# Patient Record
(demographics unavailable — no encounter records)

---

## 2025-01-31 NOTE — HISTORY OF PRESENT ILLNESS
[FreeTextEntry1] : 22 y/o F with no significant PMH was seen at [facility] ED 2x on 12/21 and 1/9 with complaints of dizziness that persisted for a few hours. She states these episodes occur multiple times daily, and last a few seconds. Symptoms are not positional. Her initial labs in ED showed anemia with hgb 10.7 12/21, which improved to 12.1 1/9. She denies any other PMH or cardiac history. She denies CP, dyspnea. Reports some palpitations associated with dizziness. Denies LE edema. No syncope.  Family Hx: grandmother had CAD, CVA in 60s Social Hx: denies smoking, ETOH, illicit drug use

## 2025-01-31 NOTE — DISCUSSION/SUMMARY
[FreeTextEntry1] : 24 y/o F with no significant PMH was seen at [facility] ED 2x on 12/21 and 1/9 with complaints of dizziness. Orthostatics negative, however patient with low BP at baseline. Advised to increase sodium and fluid intake. Advised to trial compression socks and counterpressure maneuvers. Will order TTE and 3d HM due to persistent symptoms. RTC 6 months, or sooner as needed. [EKG obtained to assist in diagnosis and management of assessed problem(s)] : EKG obtained to assist in diagnosis and management of assessed problem(s)